# Patient Record
Sex: MALE | Race: WHITE | NOT HISPANIC OR LATINO | URBAN - METROPOLITAN AREA
[De-identification: names, ages, dates, MRNs, and addresses within clinical notes are randomized per-mention and may not be internally consistent; named-entity substitution may affect disease eponyms.]

---

## 2018-11-14 ENCOUNTER — NEW PATIENT (OUTPATIENT)
Dept: URBAN - METROPOLITAN AREA CLINIC 24 | Facility: CLINIC | Age: 68
End: 2018-11-14
Payer: MEDICARE

## 2018-11-14 DIAGNOSIS — H04.123 TEAR FILM INSUFFICIENCY OF BILATERAL LACRIMAL GLANDS: Primary | ICD-10-CM

## 2018-11-14 PROCEDURE — 92002 INTRM OPH EXAM NEW PATIENT: CPT | Performed by: OPTOMETRIST

## 2018-11-14 ASSESSMENT — INTRAOCULAR PRESSURE
OD: 12
OS: 10

## 2018-11-14 ASSESSMENT — VISUAL ACUITY
OD: 20/20
OS: 20/20

## 2020-10-13 ENCOUNTER — FOLLOW UP ESTABLISHED (OUTPATIENT)
Dept: URBAN - METROPOLITAN AREA CLINIC 24 | Facility: CLINIC | Age: 70
End: 2020-10-13
Payer: MEDICARE

## 2020-10-13 PROCEDURE — 92014 COMPRE OPH EXAM EST PT 1/>: CPT | Performed by: OPTOMETRIST

## 2020-10-13 ASSESSMENT — INTRAOCULAR PRESSURE
OD: 11
OS: 12

## 2020-10-13 ASSESSMENT — VISUAL ACUITY
OD: 20/20
OS: 20/20

## 2020-10-13 ASSESSMENT — KERATOMETRY
OS: 41.84
OD: 42.37

## 2021-10-13 ENCOUNTER — OFFICE VISIT (OUTPATIENT)
Dept: URBAN - METROPOLITAN AREA CLINIC 24 | Facility: CLINIC | Age: 71
End: 2021-10-13
Payer: MEDICARE

## 2021-10-13 DIAGNOSIS — H52.4 PRESBYOPIA: ICD-10-CM

## 2021-10-13 DIAGNOSIS — H25.813 COMBINED FORMS OF AGE-RELATED CATARACT, BILATERAL: Primary | ICD-10-CM

## 2021-10-13 PROCEDURE — 92014 COMPRE OPH EXAM EST PT 1/>: CPT | Performed by: OPTOMETRIST

## 2021-10-13 PROCEDURE — 92134 CPTRZ OPH DX IMG PST SGM RTA: CPT | Performed by: OPTOMETRIST

## 2021-10-13 ASSESSMENT — VISUAL ACUITY
OS: 20/20
OD: 20/20

## 2021-10-13 ASSESSMENT — INTRAOCULAR PRESSURE
OS: 11
OD: 11

## 2021-10-13 ASSESSMENT — KERATOMETRY
OD: 42.34
OS: 41.83

## 2021-10-13 NOTE — IMPRESSION/PLAN
Impression: Tear film insufficiency of bilateral lacrimal glands: H04.123. Plan: Recommend artificial tears at least 4-6 times a day and gel drop or tear ointment at bedtime.